# Patient Record
Sex: FEMALE | Race: WHITE | NOT HISPANIC OR LATINO | Employment: FULL TIME | URBAN - METROPOLITAN AREA
[De-identification: names, ages, dates, MRNs, and addresses within clinical notes are randomized per-mention and may not be internally consistent; named-entity substitution may affect disease eponyms.]

---

## 2018-06-20 PROBLEM — N92.1 METRORRHAGIA: Status: ACTIVE | Noted: 2018-06-20

## 2019-06-04 ENCOUNTER — HOSPITAL ENCOUNTER (OUTPATIENT)
Dept: RADIOLOGY | Facility: HOSPITAL | Age: 49
Discharge: HOME | End: 2019-06-04
Attending: OTOLARYNGOLOGY
Payer: COMMERCIAL

## 2019-06-04 ENCOUNTER — OFFICE VISIT (OUTPATIENT)
Dept: OTOLARYNGOLOGY | Facility: CLINIC | Age: 49
End: 2019-06-04
Payer: COMMERCIAL

## 2019-06-04 VITALS
BODY MASS INDEX: 35.43 KG/M2 | SYSTOLIC BLOOD PRESSURE: 155 MMHG | RESPIRATION RATE: 16 BRPM | DIASTOLIC BLOOD PRESSURE: 73 MMHG | WEIGHT: 220.44 LBS | HEIGHT: 66 IN | HEART RATE: 72 BPM

## 2019-06-04 DIAGNOSIS — K21.9 LARYNGOPHARYNGEAL REFLUX (LPR): Primary | ICD-10-CM

## 2019-06-04 DIAGNOSIS — R06.00 DYSPNEA, UNSPECIFIED TYPE: ICD-10-CM

## 2019-06-04 DIAGNOSIS — E04.9 THYROID GOITER: ICD-10-CM

## 2019-06-04 PROCEDURE — 3008F PR BODY MASS INDEX (BMI) DOCUMENTED: ICD-10-PCS | Mod: CPTII,S$GLB,, | Performed by: OTOLARYNGOLOGY

## 2019-06-04 PROCEDURE — 3008F BODY MASS INDEX DOCD: CPT | Mod: CPTII,S$GLB,, | Performed by: OTOLARYNGOLOGY

## 2019-06-04 PROCEDURE — 71046 X-RAY EXAM CHEST 2 VIEWS: CPT | Mod: 26,,, | Performed by: RADIOLOGY

## 2019-06-04 PROCEDURE — 99204 OFFICE O/P NEW MOD 45 MIN: CPT | Mod: 25,S$GLB,, | Performed by: OTOLARYNGOLOGY

## 2019-06-04 PROCEDURE — 99999 PR PBB SHADOW E&M-EST. PATIENT-LVL III: CPT | Mod: PBBFAC,,, | Performed by: OTOLARYNGOLOGY

## 2019-06-04 PROCEDURE — 99204 PR OFFICE/OUTPT VISIT, NEW, LEVL IV, 45-59 MIN: ICD-10-PCS | Mod: 25,S$GLB,, | Performed by: OTOLARYNGOLOGY

## 2019-06-04 PROCEDURE — 31575 DIAGNOSTIC LARYNGOSCOPY: CPT | Mod: S$GLB,,, | Performed by: OTOLARYNGOLOGY

## 2019-06-04 PROCEDURE — 99999 PR PBB SHADOW E&M-EST. PATIENT-LVL III: ICD-10-PCS | Mod: PBBFAC,,, | Performed by: OTOLARYNGOLOGY

## 2019-06-04 PROCEDURE — 31575 PR LARYNGOSCOPY, FLEXIBLE; DIAGNOSTIC: ICD-10-PCS | Mod: S$GLB,,, | Performed by: OTOLARYNGOLOGY

## 2019-06-04 PROCEDURE — 71046 XR CHEST PA AND LATERAL: ICD-10-PCS | Mod: 26,,, | Performed by: RADIOLOGY

## 2019-06-04 PROCEDURE — 71046 X-RAY EXAM CHEST 2 VIEWS: CPT | Mod: TC,FY,PO

## 2019-06-04 RX ORDER — ATORVASTATIN CALCIUM 40 MG/1
40 TABLET, FILM COATED ORAL
COMMUNITY
Start: 2019-04-29

## 2019-06-04 RX ORDER — ESOMEPRAZOLE MAGNESIUM 40 MG/1
40 CAPSULE, DELAYED RELEASE ORAL DAILY
Qty: 30 CAPSULE | Refills: 6 | Status: SHIPPED | OUTPATIENT
Start: 2019-06-04 | End: 2019-07-25

## 2019-06-04 RX ORDER — FUROSEMIDE 20 MG/1
20 TABLET ORAL DAILY PRN
COMMUNITY
Start: 2018-07-09

## 2019-06-04 RX ORDER — CEPHRADINE 500 MG
10000 CAPSULE ORAL
COMMUNITY
Start: 2018-07-30

## 2019-06-04 NOTE — Clinical Note
Please obtain op report from Dr [facility] [phone]

## 2019-06-04 NOTE — PROGRESS NOTES
Patient Name:  Date of Encounter:   Provider: Libby Hunter MD  Referring MD: Tova Lubin MD  Endocrine surgery: Yanick Villegas MD--[facility]  PCP:Tiera Munoz MD [place]  Rad Onc:  Med Onc:  Endo: Michael Rascon MD  Derm:  Cardiology: JANEE Mabry    CC: left thyroid goiter    HPI:    Hx of left thyroidectomy prior to 2015  by  Dr Blount via a robotic transaxillary approach followed with a right thyroid lobectomy by Dr Lubin in 2015.  She first presented with sinus symptoms to a clinic.  MD felt a neck mass and ordered a CT neck with contrast  CT revealed a left thyroid mass. She was referred to Dr Lubin who then referred her here.    Dyspnea with lying supine X few months. No hx of smoking and hx of lung ds.  She reports shifting of breasts and chest tissue when she lies flat since her surgery in 2015. She reports that she had issues with her surgery prior to 2015.  No FH thyroid cancer. FH +Grave's ds in maternal aunt.  Mother has a hx of thyroid nodules.    Recent TSH was 0.84--she is no longer taking synthroid.      ROS:  Constitutional: Negative for activity change and appetite change, weight loss.   Eyes: Negative for discharge, visual changes.   Respiratory: difficulty breathing when lying supine   Cardiovascular: Negative for chest pain.   Gastrointestinal: Negative for abdominal distention and abdominal pain.   Endocrine: Negative for cold intolerance and heat intolerance.   Genitourinary: Negative for dysuria.   Musculoskeletal: Negative for gait problem, muscle pain and joint swelling.   Skin: Negative for color change and pallor; negative for skin lesions.   Neurological: Negative for syncope and weakness; no numbness face.   Psychiatric/Behavioral: Negative for agitation and confusion; negative for depression.    Physical Exam:      Constitutional  · General Appearance: well nourished, well-developed, alert, oriented, in no acute distress  · Communication: ability, understanding,  normal  Head and Face  · Inspection: normocephalic, atraumatic, no scars, lesions or masses   · Palpation: no stepoffs, sinus tenderness or masses  · Parotid glands: no masses, stones, swelling or tenderness  Eyes  · Ocular Motility / Alignment: normal alignment, motility, no proptosis, enophthalmus or nystagmus  · Conjunctiva: not injected  · Eyelids: no hooding, lag, entropion, or ectropion  Ears  · Hearing: speech reception thresholds grossly normal  · External Ears: no auricle lesions, non-tender, mobile to palpation  · Otoscopy:  · Right Ear: no tympanic membrane lesions, perforations, or effusion, normal EAC  · Left Ear: no tympanic membrane lesions, perforations, or effusion, normal EAC  Nose  · External Nose: no lesions, tenderness, trauma or deformity  · Intranasal Exam: no edema, erythema, discharge, mass or obstruction  Oral Cavity / Oropharynx  · Lips: upper and lower lips pink and moist  · Teeth: good dentition  · Gingiva: healthy  · Oral Mucosa: moist, no mucosal lesions  · Floor of Mouth: normal, no lesions, salivary ducts patent  · Tongue: moist, normal mobility, no lesions  · Palate: soft and hard palates without lesions or ulcers  Oropharynx: tonsils and walls without erythema, exudate, base of tongue soft to palpation  Nasopharynx, Hypopharynx, and Larynx  · Indirect: could not perform exam due to intolerance by patient  Neck  · Inspection and Palpation: no erythema, induration, emphysema, well healed collar incision; normal neck extension  · Larynx and Trachea: normal position; normal crepitus  · Thyroid: palpable left thyroid nodule  · Submandibular Glands: no masses or tenderness  Lymphatic:  · Anterior, Posterior, Submandibular, Submental, Supraclavicular: no lymphadenopathy present  Chest / Respiratory  · Chest: no stridor or retractions, normal effort and expansion  Cardiovascular:  · Pulses: 2+ carotid pulses bilaterally  · Auscultation: deferred  Neurological  · Cranial Nerves: grossly  intact  · General: no focal deficits  Psychiatric  · Orientation: oriented to time, place and person  · Mood and Affect: no depression, anxiety or agitation  Extremities  · Inspection: moves all extremities well  Donor site  Chest, Back, Abdomen, Arms, Legs: N/A    PROCEDURES:   -------------------- LARYNGOSCOPY / NASOPHARYNGOSCOPY -------------------------     Pre-op DX:  Post-op DX: same  Anesthesia: Topical Neosynephrine / Tetracaine  Indications: to look at lesion  Adverse Events: None        Procedure in Detail:     Flexible endoscopy with video was performed through the nasal passages. The nasal cavity, nasopharynx, oropharynx, hypopharynx and larynx were adequately visualized. The true vocal cords and arytenoids were examined during phonation and repose.        Operative Findings:     Nasal Cavity: Within normal limits  Nasopharynx: Within normal limits  Tongue Base: Within normal limits  Pharyngeal Walls: Within normal limits  Epiglottis / Aryepiglottic Folds: Within normal limits  Pyriform Sinus: Within normal limits  Vocal Cords: Within normal limits  Arytenoids: Within normal limits       Test results:  Labs: TSH 0.84  Path:  US:  CXR:  CT scans:  PET CT scan:    I personally reviewed the following imaging: CT neck with contrast    Records reviewed: Notes from Dr. Lubin     Assessment:   S/P total thyroidectdomy with recurrent left thyroid goiter S/P transaxillary robotic approach  Dyspnea    Plan:  CXR PA and lateral to eval dyspnea  Recommend completion thyroidectomy for recurrent left thyroid goiter  Will need to review Dr Villegas's op report prior to OR--will obtain copy from [facility]  Risks, benefits and alternatives were discussed with patient and her mother and questions answered. Risks include but no limited to: bleeding, scarring, infection, hematoma, seroma, temporary or permanent hoarseness; possible trach, temporary or permanent hypocalcemia due to hypoparathyroidism; need for thyroid  hormone replacement  Patient would like to consider the risks of surgery  Alternative: continue to monitor goiter for growth with possible increased growth and airway compromise  She will with her decision

## 2019-06-10 PROBLEM — K21.9 LARYNGOPHARYNGEAL REFLUX (LPR): Status: ACTIVE | Noted: 2019-06-10

## 2019-06-10 PROBLEM — E04.9 THYROID GOITER: Status: ACTIVE | Noted: 2019-06-10

## 2019-06-10 PROBLEM — R06.00 DYSPNEA: Status: ACTIVE | Noted: 2019-06-10

## 2019-06-11 ENCOUNTER — TELEPHONE (OUTPATIENT)
Dept: OTOLARYNGOLOGY | Facility: CLINIC | Age: 49
End: 2019-06-11

## 2019-06-11 NOTE — TELEPHONE ENCOUNTER
Called to [facility] got the correct number for Dr. Villegas's office [phone]  Sent fax over to medical records

## 2019-06-11 NOTE — TELEPHONE ENCOUNTER
----- Message from Libby Hunter MD sent at 6/10/2019 12:49 AM CDT -----  Please obtain op report from Dr Yanick Villegas's office at [facility] in [facility]  Office number [phone]  Thanks

## 2019-06-18 ENCOUNTER — TELEPHONE (OUTPATIENT)
Dept: OTOLARYNGOLOGY | Facility: CLINIC | Age: 49
End: 2019-06-18

## 2019-06-18 NOTE — TELEPHONE ENCOUNTER
Alisa   I will need a copy of the op reports from   JANEE Ellison and   Dr. Yanick Villegas at [facility]  It is not safe for me to proceed without them  Thank you    Also the patient will need to followup with me to go over surgery again and sign consent    Thank you

## 2019-06-18 NOTE — TELEPHONE ENCOUNTER
2 week call back to check patient status.  Patient would like to go forward with surgery.  Mid July if possible.  Please advise

## 2019-06-21 ENCOUNTER — TELEPHONE (OUTPATIENT)
Dept: OTOLARYNGOLOGY | Facility: CLINIC | Age: 49
End: 2019-06-21

## 2019-06-21 NOTE — TELEPHONE ENCOUNTER
----- Message from Walt Horton sent at 6/21/2019 11:57 AM CDT -----  Contact: self   Patient need results of chest xray, please call back at [phone] (home)

## 2019-06-21 NOTE — TELEPHONE ENCOUNTER
Spoke with patient advised Dr. Hunter would have to read results for her when she comes in. Scheduled appointment for next week.

## 2019-06-25 ENCOUNTER — OFFICE VISIT (OUTPATIENT)
Dept: OTOLARYNGOLOGY | Facility: CLINIC | Age: 49
End: 2019-06-25
Payer: COMMERCIAL

## 2019-06-25 ENCOUNTER — TELEPHONE (OUTPATIENT)
Dept: OTOLARYNGOLOGY | Facility: CLINIC | Age: 49
End: 2019-06-25

## 2019-06-25 VITALS
HEART RATE: 67 BPM | SYSTOLIC BLOOD PRESSURE: 143 MMHG | WEIGHT: 220.25 LBS | RESPIRATION RATE: 16 BRPM | DIASTOLIC BLOOD PRESSURE: 60 MMHG | BODY MASS INDEX: 35.4 KG/M2 | HEIGHT: 66 IN

## 2019-06-25 DIAGNOSIS — R22.1 NECK MASS: ICD-10-CM

## 2019-06-25 DIAGNOSIS — K21.9 LARYNGOPHARYNGEAL REFLUX (LPR): ICD-10-CM

## 2019-06-25 DIAGNOSIS — E04.9 THYROID GOITER: Primary | ICD-10-CM

## 2019-06-25 PROCEDURE — 99999 PR PBB SHADOW E&M-EST. PATIENT-LVL III: CPT | Mod: PBBFAC,,, | Performed by: OTOLARYNGOLOGY

## 2019-06-25 PROCEDURE — 99213 PR OFFICE/OUTPT VISIT, EST, LEVL III, 20-29 MIN: ICD-10-PCS | Mod: S$GLB,,, | Performed by: OTOLARYNGOLOGY

## 2019-06-25 PROCEDURE — 99213 OFFICE O/P EST LOW 20 MIN: CPT | Mod: S$GLB,,, | Performed by: OTOLARYNGOLOGY

## 2019-06-25 PROCEDURE — 99999 PR PBB SHADOW E&M-EST. PATIENT-LVL III: ICD-10-PCS | Mod: PBBFAC,,, | Performed by: OTOLARYNGOLOGY

## 2019-06-25 PROCEDURE — 3008F BODY MASS INDEX DOCD: CPT | Mod: CPTII,S$GLB,, | Performed by: OTOLARYNGOLOGY

## 2019-06-25 PROCEDURE — 3008F PR BODY MASS INDEX (BMI) DOCUMENTED: ICD-10-PCS | Mod: CPTII,S$GLB,, | Performed by: OTOLARYNGOLOGY

## 2019-06-25 NOTE — TELEPHONE ENCOUNTER
----- Message from Mary Ann Bustos sent at 6/25/2019 12:21 PM CDT -----  Contact: [phone]  Patient may be 10-15 min late due to car trouble.    Call was placed to pod no answer.  Patient contact # [phone]

## 2019-06-25 NOTE — PROGRESS NOTES
Patient Name:  Date of Encounter:   Provider: Libby Hunter MD  Referring MD: Tova Lubin MD  Endocrine surgery: Yanick Villegas MD--[facility]  PCP:Tiera Munoz MD [place]  Rad Onc:  Med Onc:  Endo: Michael Rascon MD  Derm:  Cardiology: JANEE Mabry    CC: left thyroid goiter    HPI:    Hx of left thyroidectomy prior to 2015  by  Dr Blount via a robotic transaxillary approach followed with a right thyroid lobectomy by Dr Lubin in 2015.  She first presented with sinus symptoms to a clinic.  MD felt a neck mass and ordered a CT neck with contrast  CT revealed a left thyroid mass. She was referred to Dr Lubin who then referred her here.    Dyspnea with lying supine X few months. No hx of smoking and hx of lung ds.  She reports shifting of breasts and chest tissue when she lies flat since her surgery in 2015. She reports that she had issues with her surgery prior to 2015.  No FH thyroid cancer. FH +Grave's ds in maternal aunt.  Mother has a hx of thyroid nodules.    Recent TSH was 0.84--she is no longer taking synthroid.    6/25/2019  She is here today to discuss srugery again.She has decided to proceed  She reports today that she has a palpable lymph node on her right superior posterior neck that has been present for a few months.  This mass is not grown in size. She has no history of scalp lesions      She has no new thyroid complaints  Still unable to obtain Dr Villegas's op report from 2011.  Have requested medical records and have spoken with his nurse.      ROS:  Constitutional: Negative for activity change and appetite change, weight loss.   Eyes: Negative for discharge, visual changes.   Respiratory: difficulty breathing when lying supine   Cardiovascular: Negative for chest pain.   Gastrointestinal: Negative for abdominal distention and abdominal pain.   Endocrine: Negative for cold intolerance and heat intolerance.   Genitourinary: Negative for dysuria.   Musculoskeletal: Negative for gait problem,  muscle pain and joint swelling.   Skin: Negative for color change and pallor; negative for skin lesions.   Neurological: Negative for syncope and weakness; no numbness face.   Psychiatric/Behavioral: Negative for agitation and confusion; negative for depression.    Physical Exam:      Constitutional  · General Appearance: well nourished, well-developed, alert, oriented, in no acute distress  · Communication: ability, understanding, normal  Head and Face  · Inspection: normocephalic, atraumatic, no scars, lesions or masses   · Palpation: no stepoffs, sinus tenderness or masses  · Parotid glands: no masses, stones, swelling or tenderness  Neck  · Inspection and Palpation: no erythema, induration, emphysema, well healed collar incision; normal neck extension  · Larynx and Trachea: normal position; normal crepitus  · Thyroid: palpable left thyroid nodule  · Submandibular Glands: no masses or tenderness  Lymphatic:  · Anterior, Posterior, Submandibular, Submental, Supraclavicular: right post superior node palpated; immobile; NT  Chest / Respiratory  · Chest: no stridor or retractions, normal effort and expansion  Neurological  · Cranial Nerves: grossly intact  · General: no focal deficits  Psychiatric  · Orientation: oriented to time, place and person  · Mood and Affect: no depression, anxiety or agitation     I personally reviewed the following imaging: CT neck with contrast    Records reviewed: Notes from Dr. Lubin     Assessment:   S/P total thyroidectdomy with recurrent left thyroid goiter S/P transaxillary robotic approach  Dyspnea of unknown etiology  Right posterior superior palpable neck mass    Plan:  -Will obtain ultrasound guided FNA of new neck mass-  -Continue to attempt to obtain Dr Villegas's op report prior to OR--may need to proceed without it if unobtainable  -Risks, benefits and alternatives were discussed again with the patient Risks include but no limited to: bleeding, scarring, infection, hematoma,  seroma, temporary or permanent hoarseness; possible trach, temporary or permanent hypocalcemia due to hypoparathyroidism; need for thyroid hormone replacement  -I will call her with FNA results and we will schedule a date for surgery

## 2019-06-26 PROBLEM — R22.1 NECK MASS: Status: ACTIVE | Noted: 2019-06-26

## 2019-07-02 ENCOUNTER — HOSPITAL ENCOUNTER (OUTPATIENT)
Dept: RADIOLOGY | Facility: HOSPITAL | Age: 49
Discharge: HOME | End: 2019-07-02
Attending: OTOLARYNGOLOGY
Payer: COMMERCIAL

## 2019-07-02 DIAGNOSIS — R22.1 NECK MASS: ICD-10-CM

## 2019-07-02 PROCEDURE — 10005 FNA BX W/US GDN 1ST LES: CPT | Mod: PO

## 2019-07-02 PROCEDURE — 88173 CYTOPATH EVAL FNA REPORT: CPT | Performed by: PATHOLOGY

## 2019-07-02 PROCEDURE — 10006 US FINE NEEDLE ASPIRATION BIOPSY , ADDL LESION: ICD-10-PCS | Mod: ,,, | Performed by: RADIOLOGY

## 2019-07-02 PROCEDURE — 10005 FNA BX W/US GDN 1ST LES: CPT | Mod: ,,, | Performed by: RADIOLOGY

## 2019-07-02 PROCEDURE — 10021 FNA BX W/O IMG GDN 1ST LES: CPT | Mod: ,,, | Performed by: PATHOLOGY

## 2019-07-02 PROCEDURE — 10006 FNA BX W/US GDN EA ADDL: CPT | Mod: PO

## 2019-07-02 PROCEDURE — 88173 CYTOLOGY SPECIMEN-FNA NON-RADIOLOGY CLINICIAN PERFORMED W/O ON SITE: ICD-10-PCS | Mod: 26,,, | Performed by: PATHOLOGY

## 2019-07-02 PROCEDURE — 10006 FNA BX W/US GDN EA ADDL: CPT | Mod: ,,, | Performed by: RADIOLOGY

## 2019-07-02 PROCEDURE — 25000003 PHARM REV CODE 250: Mod: PO | Performed by: OTOLARYNGOLOGY

## 2019-07-02 PROCEDURE — 10021 CYTOLOGY SPECIMEN-FNA NON-RADIOLOGY CLINICIAN PERFORMED W/O ON SITE: ICD-10-PCS | Mod: ,,, | Performed by: PATHOLOGY

## 2019-07-02 PROCEDURE — 10005 PR FINE NEEDLE ASP BIOPSY, W/US GUIDANCE, 1ST LESION: ICD-10-PCS | Mod: ,,, | Performed by: RADIOLOGY

## 2019-07-02 PROCEDURE — 88173 CYTOPATH EVAL FNA REPORT: CPT | Mod: 26,,, | Performed by: PATHOLOGY

## 2019-07-02 RX ORDER — LIDOCAINE HYDROCHLORIDE 10 MG/ML
10 INJECTION INFILTRATION; PERINEURAL ONCE
Status: COMPLETED | OUTPATIENT
Start: 2019-07-02 | End: 2019-07-02

## 2019-07-02 RX ADMIN — LIDOCAINE HYDROCHLORIDE 10 ML: 10 INJECTION, SOLUTION EPIDURAL; INFILTRATION; INTRACAUDAL; PERINEURAL at 09:07

## 2019-07-09 ENCOUNTER — TELEPHONE (OUTPATIENT)
Dept: OTOLARYNGOLOGY | Facility: CLINIC | Age: 49
End: 2019-07-09

## 2019-07-09 DIAGNOSIS — E04.9 GOITER: Primary | ICD-10-CM

## 2019-07-09 NOTE — TELEPHONE ENCOUNTER
----- Message from Elvira Argueta sent at 7/9/2019 12:03 PM CDT -----  Contact: PT  Calling in regards to please giver her a call back about results and surgery and please advise [phone]

## 2019-07-10 NOTE — TELEPHONE ENCOUNTER
----- Message from Khurram Aquino sent at 7/10/2019 11:02 AM CDT -----  Contact: pt  Type:  Patient Returning Call    Who Called:  pt  Who Left Message for Patient:  Cielo  Does the patient know what this is regarding?:  Returning pt's phone call  Best Call Back Number:  [phone]  Additional Information:

## 2019-07-23 ENCOUNTER — TELEPHONE (OUTPATIENT)
Dept: OTOLARYNGOLOGY | Facility: CLINIC | Age: 49
End: 2019-07-23

## 2019-07-23 NOTE — TELEPHONE ENCOUNTER
----- Message from Anamika Meza sent at 7/23/2019 10:28 AM CDT -----  Contact: Patient  Type: Needs Medical Advice    Who Called:  Bindu Mccoy Call Back Number: [phone]  Additional Information: Patient is stating that she is scheduled for surgery next Wednesday and she wants to know about time frame for it.Please call back and advise.

## 2019-07-23 NOTE — TELEPHONE ENCOUNTER
I spoke with the patient she needs a letter stating how long she will be out of work.  Patient stated she feels she may need the whole month of August off. Please advise.

## 2019-07-31 DIAGNOSIS — G89.18 POST-OP PAIN: Primary | ICD-10-CM

## 2019-07-31 DIAGNOSIS — E03.9 ACQUIRED HYPOTHYROIDISM: ICD-10-CM

## 2019-07-31 PROBLEM — E04.9 GOITER: Status: ACTIVE | Noted: 2019-07-31

## 2019-07-31 RX ORDER — LEVOTHYROXINE SODIUM 112 UG/1
112 TABLET ORAL DAILY
Qty: 30 TABLET | Refills: 11 | Status: SHIPPED | OUTPATIENT
Start: 2019-07-31 | End: 2020-07-30

## 2019-07-31 RX ORDER — HYDROCODONE BITARTRATE AND ACETAMINOPHEN 10; 325 MG/1; MG/1
1 TABLET ORAL EVERY 4 HOURS PRN
Qty: 42 TABLET | Refills: 0 | Status: SHIPPED | OUTPATIENT
Start: 2019-07-31

## 2019-08-01 ENCOUNTER — TELEPHONE (OUTPATIENT)
Dept: OTOLARYNGOLOGY | Facility: CLINIC | Age: 49
End: 2019-08-01

## 2019-08-01 NOTE — TELEPHONE ENCOUNTER
----- Message from Coretta Coles sent at 8/1/2019  4:34 PM CDT -----  Contact: self  Type: Needs Medical Advice    Who Called: self  Best Call Back Number: [phone] (home)   Additional Information: Patient was discharged 07/31/18 and need a two week f/u she said she not sure if its two or one week f/u please call patient for an appt

## 2019-08-05 ENCOUNTER — TELEPHONE (OUTPATIENT)
Dept: OTOLARYNGOLOGY | Facility: CLINIC | Age: 49
End: 2019-08-05

## 2019-08-05 NOTE — TELEPHONE ENCOUNTER
----- Message from Chante Hairston sent at 8/5/2019  9:13 AM CDT -----  Contact: Patient  Type: Needs Medical Advice    Who Called:  Patient  Symptoms (please be specific):   na  How long has patient had these symptoms:  lavonne  Pharmacy name and phone #:  [facility]  Best Call Back Number: [phone] (home)    Additional Information: Patient is requesting a call to discuss questions that she has regarding her surgery that was done last week. Please call to advise, thank you!

## 2019-08-05 NOTE — TELEPHONE ENCOUNTER
S/w pt that states she is having swelling at the incision. Pt states that she put ice on it last night and it did seem to help. Advised pt she can use ice to help with the swelling just no longer than 15 minutes at a time. Pt verbalized understanding. Also states that she is still coughing up a lot of mucus. Advised pt this is normal after surgery, and to make sure she is drinking plenty of fluids to help thin the mucus. Pt verbalized understanding.

## 2019-08-07 ENCOUNTER — OFFICE VISIT (OUTPATIENT)
Dept: OTOLARYNGOLOGY | Facility: CLINIC | Age: 49
End: 2019-08-07
Payer: COMMERCIAL

## 2019-08-07 ENCOUNTER — LAB VISIT (OUTPATIENT)
Dept: LAB | Facility: HOSPITAL | Age: 49
End: 2019-08-07
Attending: OTOLARYNGOLOGY
Payer: COMMERCIAL

## 2019-08-07 VITALS
BODY MASS INDEX: 36.87 KG/M2 | DIASTOLIC BLOOD PRESSURE: 78 MMHG | HEIGHT: 65 IN | SYSTOLIC BLOOD PRESSURE: 155 MMHG | WEIGHT: 221.31 LBS

## 2019-08-07 DIAGNOSIS — E03.9 ACQUIRED HYPOTHYROIDISM: ICD-10-CM

## 2019-08-07 DIAGNOSIS — E83.51 HYPOCALCEMIA: ICD-10-CM

## 2019-08-07 DIAGNOSIS — E04.9 GOITER: ICD-10-CM

## 2019-08-07 DIAGNOSIS — E83.51 HYPOCALCEMIA: Primary | ICD-10-CM

## 2019-08-07 LAB
ALBUMIN SERPL BCP-MCNC: 3.4 G/DL (ref 3.5–5.2)
ALP SERPL-CCNC: 117 U/L (ref 55–135)
ALT SERPL W/O P-5'-P-CCNC: 20 U/L (ref 10–44)
ANION GAP SERPL CALC-SCNC: 12 MMOL/L (ref 8–16)
AST SERPL-CCNC: 11 U/L (ref 10–40)
BILIRUB SERPL-MCNC: 0.4 MG/DL (ref 0.1–1)
BUN SERPL-MCNC: 12 MG/DL (ref 6–20)
CALCIUM SERPL-MCNC: 7.4 MG/DL (ref 8.7–10.5)
CHLORIDE SERPL-SCNC: 103 MMOL/L (ref 95–110)
CO2 SERPL-SCNC: 26 MMOL/L (ref 23–29)
CREAT SERPL-MCNC: 0.8 MG/DL (ref 0.5–1.4)
EST. GFR  (AFRICAN AMERICAN): >60 ML/MIN/1.73 M^2
EST. GFR  (NON AFRICAN AMERICAN): >60 ML/MIN/1.73 M^2
GLUCOSE SERPL-MCNC: 92 MG/DL (ref 70–110)
POTASSIUM SERPL-SCNC: 4.1 MMOL/L (ref 3.5–5.1)
PROT SERPL-MCNC: 8.4 G/DL (ref 6–8.4)
PTH-INTACT SERPL-MCNC: 25 PG/ML (ref 9–77)
SODIUM SERPL-SCNC: 141 MMOL/L (ref 136–145)
TSH SERPL DL<=0.005 MIU/L-ACNC: 3.52 UIU/ML (ref 0.4–4)

## 2019-08-07 PROCEDURE — 99024 POSTOP FOLLOW-UP VISIT: CPT | Mod: S$GLB,,, | Performed by: OTOLARYNGOLOGY

## 2019-08-07 PROCEDURE — 80053 COMPREHEN METABOLIC PANEL: CPT | Mod: PO

## 2019-08-07 PROCEDURE — 99024 PR POST-OP FOLLOW-UP VISIT: ICD-10-PCS | Mod: S$GLB,,, | Performed by: OTOLARYNGOLOGY

## 2019-08-07 PROCEDURE — 99999 PR PBB SHADOW E&M-EST. PATIENT-LVL III: ICD-10-PCS | Mod: PBBFAC,,, | Performed by: OTOLARYNGOLOGY

## 2019-08-07 PROCEDURE — 36415 COLL VENOUS BLD VENIPUNCTURE: CPT | Mod: PO

## 2019-08-07 PROCEDURE — 99999 PR PBB SHADOW E&M-EST. PATIENT-LVL III: CPT | Mod: PBBFAC,,, | Performed by: OTOLARYNGOLOGY

## 2019-08-07 PROCEDURE — 83970 ASSAY OF PARATHORMONE: CPT

## 2019-08-07 PROCEDURE — 84443 ASSAY THYROID STIM HORMONE: CPT

## 2019-08-07 NOTE — PROGRESS NOTES
Patient Name:  Date of Encounter:   Provider: Libby Hunter MD  Referring MD: Tova Lubin MD  Endocrine surgery: Yanick Villegas MD--[facility]  PCP:Tiera Munoz MD [place]  Rad Onc:  Med Onc:  Endo: Michael Rascon MD  Derm:  Cardiology: JANEE Mabry    CC: left thyroid goiter    HPI:    Hx of left thyroidectomy prior to 2015  by  Dr Blount via a robotic transaxillary approach followed with a right thyroid lobectomy by Dr Lubin in 2015.  She first presented with sinus symptoms to a clinic.  MD felt a neck mass and ordered a CT neck with contrast  CT revealed a left thyroid mass. She was referred to Dr Lubin who then referred her here.    Dyspnea with lying supine X few months. No hx of smoking and hx of lung ds.  She reports shifting of breasts and chest tissue when she lies flat since her surgery in 2015. She reports that she had issues with her surgery prior to 2015.  No FH thyroid cancer. FH +Grave's ds in maternal aunt.  Mother has a hx of thyroid nodules.    Recent TSH was 0.84--she is no longer taking synthroid.    6/25/2019  She is here today to discuss srugery again.She has decided to proceed  She reports today that she has a palpable lymph node on her right superior posterior neck that has been present for a few months.  This mass is not grown in size. She has no history of scalp lesions      She has no new thyroid complaints  Still unable to obtain Dr Villegas's op report from 2011.  Have requested medical records and have spoken with his nurse.    8/7/2019  Patient is here today for postop follow-up visit.  She denies hoarseness, dyspnea and dysphagia.  She is tolerating a regular diet.  She is taking Synthroid 112 mcg a day  However she does complain of cramping in her fingers and toes.      ROS:  Constitutional: Negative for activity change and appetite change, weight loss.   Eyes: Negative for discharge, visual changes.   Respiratory: difficulty breathing when lying supine   Cardiovascular:  Negative for chest pain.   Gastrointestinal: Negative for abdominal distention and abdominal pain.   Endocrine: Negative for cold intolerance and heat intolerance.   Genitourinary: Negative for dysuria.   Musculoskeletal: Negative for gait problem, muscle pain and joint swelling.   Skin: Negative for color change and pallor; negative for skin lesions.   Neurological: Negative for syncope and weakness; no numbness face.   Psychiatric/Behavioral: Negative for agitation and confusion; negative for depression.    Physical Exam:      Constitutional  · General Appearance: well nourished, well-developed, alert, oriented, in no acute distress  · Communication: ability, understanding, normal  Head and Face  · Inspection: normocephalic, atraumatic, no scars, lesions or masses; Positive Chevostek's sign  · Palpation: no stepoffs, sinus tenderness or masses  · Parotid glands: no masses, stones, swelling or tenderness  Neck  · Inspection and Palpation:  Incision intact; mild edema; no hematoma   · Larynx and Trachea: normal position; normal crepitus  · Thyroid:   · Submandibular Glands: no masses or tenderness  Neurological  · Cranial Nerves: grossly intact  · General: no focal deficits  Psychiatric  · Orientation: oriented to time, place and person  · Mood and Affect: no depression, anxiety or agitation       PATH  LEFT THYROID, LOBECTOMY:  - NODULAR GOITER.   - ONE INTRACAPSULAR PARATHYROID.   - ONE UNREMARKABLE LYMPH NODE.     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  Path report revealed a nodular goiter with 1 intracapsular parathyroid gland.  She is previously status post a total thyroidectomy-left thyroid lobectomy status post trans axillary robotic approach in 2011 and right thyroid lobectomy in 2015  -acquired hypothyroidism and hypo parathyroidism; right neck was not oriented which indicates that right-sided parathyroid glands were not properly  functioning      Plan:  TSH, calcium, albumin, iPTH today  F/U with Dr Rascon next week  500 mg calcium carbonate tid  Sutures removed- Wound care instructions given

## 2019-08-07 NOTE — PATIENT INSTRUCTIONS
Can get strips wet and pat dry  They will fall off  After strips fall off apply ScarAway to incision  Wear ScarAway all day and remove at bedtime  Apply aquaphor to incision every night  Wear sunblock

## 2019-08-09 ENCOUNTER — TELEPHONE (OUTPATIENT)
Dept: OTOLARYNGOLOGY | Facility: CLINIC | Age: 49
End: 2019-08-09

## 2019-08-09 NOTE — TELEPHONE ENCOUNTER
Spoke with patient advised Dr. Hunter  Is in surgery today she will possibly be able to to finish paperwork on Tuesday of next week. Patient also wanted to know that besides driving what other restrictions do she have, please advise.

## 2019-08-09 NOTE — TELEPHONE ENCOUNTER
----- Message from Ravenalejandra Ramirez sent at 8/9/2019  8:46 AM CDT -----  Contact: self [phone]  She is calling to check the status of completion of the FMLA form.  Also she would like to know what restrictions she has.  Please call her.  Thank you!

## 2019-08-14 ENCOUNTER — TELEPHONE (OUTPATIENT)
Dept: OTOLARYNGOLOGY | Facility: CLINIC | Age: 49
End: 2019-08-14

## 2019-08-14 NOTE — TELEPHONE ENCOUNTER
----- Message from Gen Lanza sent at 8/14/2019 12:01 PM CDT -----  Contact: Patient  Type: Needs Medical Advice    Who Called:  Patient  Best Call Back Number: [phone]  Additional Information: Calling to check completion status of FMLA paperwork that was dropped off to office about 2 weeks ago. Please advise of status

## 2019-08-14 NOTE — TELEPHONE ENCOUNTER
Spoke with patient she wanted to know if her FMLA paperwork was done advised Dr. Hunter will be in clinic tomorrow and should be able to fill it out. Patient verbalized understanding. Paperwork is on your desk in sign folder

## 2019-08-19 ENCOUNTER — TELEPHONE (OUTPATIENT)
Dept: OTOLARYNGOLOGY | Facility: CLINIC | Age: 49
End: 2019-08-19

## 2021-05-10 ENCOUNTER — PATIENT MESSAGE (OUTPATIENT)
Dept: RESEARCH | Facility: HOSPITAL | Age: 51
End: 2021-05-10